# Patient Record
Sex: MALE | Race: WHITE | Employment: FULL TIME | URBAN - METROPOLITAN AREA
[De-identification: names, ages, dates, MRNs, and addresses within clinical notes are randomized per-mention and may not be internally consistent; named-entity substitution may affect disease eponyms.]

---

## 2021-10-28 ENCOUNTER — FLU SHOT (OUTPATIENT)
Dept: VACCINE CLINIC | Facility: HOSPITAL | Age: 41
End: 2021-10-28

## 2021-10-28 DIAGNOSIS — Z23 NEED FOR INFLUENZA VACCINATION: Primary | ICD-10-CM

## 2024-08-20 ENCOUNTER — OFFICE VISIT (OUTPATIENT)
Dept: SURGERY | Facility: CLINIC | Age: 44
End: 2024-08-20
Payer: COMMERCIAL

## 2024-08-20 VITALS — BODY MASS INDEX: 40.92 KG/M2 | RESPIRATION RATE: 16 BRPM | WEIGHT: 270 LBS | HEIGHT: 68 IN

## 2024-08-20 DIAGNOSIS — K42.9 UMBILICAL HERNIA WITHOUT OBSTRUCTION AND WITHOUT GANGRENE: Primary | ICD-10-CM

## 2024-08-20 PROCEDURE — 99203 OFFICE O/P NEW LOW 30 MIN: CPT | Performed by: SURGERY

## 2024-08-20 RX ORDER — CETIRIZINE HYDROCHLORIDE 10 MG/1
10 TABLET ORAL DAILY
COMMUNITY

## 2024-08-20 NOTE — LETTER
August 21, 2024       No Recipients    Patient: Aden Benitez   YOB: 1980   Date of Visit: 8/20/2024     Dear Felipa Snyder MD:       Thank you for referring Aden Benitez to me for evaluation. Below are the relevant portions of my assessment and plan of care.    If you have questions, please do not hesitate to call me. I look forward to following Aden along with you.         Sincerely,        Yosef Mcfarland MD        CC:   No Recipients    Yosef Mcfarland MD  08/21/24 1029  Sign when Signing Visit  General Surgery/Colorectal Surgery Note    Patient Name:  Aden Benitez  YOB: 1980  [id number]    Referring Provider: Felipa Snyder MD      Patient Care Team:  Felipa Snyder MD as PCP - General (Internal Medicine)  Yosef Mcfarland MD as Consulting Physician (General Surgery)    Chief complaint hernia    Subjective.     History of present illness:    6-month history of a bulge to the umbilicus.  No change in size.  No history of the same.  No previous abdominal surgery.  No incarceration or strangulation.  No tobacco use.  No blood thinner use.  No recent chest pain.  Occasional pain.  No alleviating factors.  Ultrasound 7/26/2024 with focal periumbilical abdominal wall hernia.  BMI 41.      History:  History reviewed. No pertinent past medical history.    History reviewed. No pertinent surgical history.    Family History   Problem Relation Age of Onset   • Heart disease Father         Heart valve issue       Social History     Tobacco Use   • Smoking status: Never   Substance Use Topics   • Alcohol use: Yes     Alcohol/week: 2.0 standard drinks of alcohol     Types: 1 Glasses of wine, 1 Cans of beer per week     Comment: Social drink less than one per week on average   • Drug use: Never       Review of Systems  All systems were reviewed and negative except for:   Review of Systems   Constitutional: Negative for chills, fever and unexpected weight loss.    HENT: Negative for congestion, nosebleeds and voice change.    Eyes: Negative for blurred vision, double vision and discharge.   Respiratory: Negative for apnea, chest tightness and shortness of breath.    Cardiovascular: Negative for chest pain and leg swelling.   Gastrointestinal:        See HPI   Endocrine: Negative for cold intolerance and heat intolerance.   Genitourinary: Negative for dysuria, hematuria and urgency.   Musculoskeletal: Negative for back pain, joint swelling and neck pain.   Skin: Negative for color change and dry skin.   Neurological: Negative for dizziness and confusion.   Hematological: Negative for adenopathy.   Psychiatric/Behavioral: Negative for agitation and behavioral problems.     MEDS:  Prior to Admission medications    Medication Sig Start Date End Date Taking? Authorizing Provider   cetirizine (zyrTEC) 10 MG tablet Take 1 tablet by mouth Daily.   Yes Provider, Historical, MD        Allergies:  Patient has no known allergies.    Objective    Vital Signs        Physical Exam:     General Appearance:    Alert, cooperative, in no acute distress   Head:    Normocephalic, without obvious abnormality, atraumatic   Eyes:          Conjunctivae and sclerae normal, no icterus,     Ears:    Ears appear intact with no abnormalities noted   Throat:   No oral lesions, no thrush, oral mucosa moist   Neck:   No adenopathy, supple, trachea midline, no thyromegaly   Back:     No kyphosis present, no scoliosis present, no skin lesions,      erythema or scars, no tenderness to percussion or                   palpation,   range of motion normal   Lungs:     Clear to auscultation,respirations regular, even and                  unlabored    Heart:    Regular rhythm and normal rate, normal S1 and S2, no            murmur, no gallop, no rub, no click   Chest Wall:    No abnormalities observed   Abdomen:     Normal bowel sounds, no masses, no organomegaly, soft        non-tender, non-distended, no guarding,  "no rebound                tenderness, nonreducible umbilical hernia without evidence of obstruction or gangrene   Rectal:        Extremities:   Moves all extremities well, no edema, no cyanosis, no             redness   Pulses:   Pulses palpable and equal bilaterally   Skin:   No bleeding, bruising or rash   Lymph nodes:   No palpable adenopathy   Neurologic:   A/o x 4 with no deficits       Results Review:   {Results Review:71071::\"I reviewed the patient's new clinical results.\"    LABS/IMAGING:  No results found for this or any previous visit.     Result Review:{Labs  Result Review  Imaging  Med Tab  Media     Assessment & Plan    Initial nonreducible umbilical hernia without evidence of obstruction or gangrene    Discussion with patient.  I explained him what a hernia was.  I discussed the warning signs of strangulation.  He was instructed on the emergency department for any concern.  I recommended robotic possible open umbilical hernia repair with mesh.  I explained the procedure and recovery.  Benefits and alternatives discussed.  Risk procedure including risk of anesthesia, bleeding, infection, mesh infection, conversion open, hernia recurrence, damage to surrounding structures including bowel, heart attack, stroke, blood clot, pneumonia discussed.  All questions answered.  He agrees with the plan.  Orders placed.  He was instructed to use chlorhexidine the night before surgery.  Thank you for the consult.              This document has been electronically signed by Yosef Mcfarland MD  August 21, 2024 10:26 EDT  "

## 2024-08-21 NOTE — PROGRESS NOTES
General Surgery/Colorectal Surgery Note    Patient Name:  Aden Benitez  YOB: 1980  [id number]    Referring Provider: Felipa Snyder MD      Patient Care Team:  Felipa Snyder MD as PCP - General (Internal Medicine)  Yosef Mcfarland MD as Consulting Physician (General Surgery)    Chief complaint hernia    Subjective .     History of present illness:    6-month history of a bulge to the umbilicus.  No change in size.  No history of the same.  No previous abdominal surgery.  No incarceration or strangulation.  No tobacco use.  No blood thinner use.  No recent chest pain.  Occasional pain.  No alleviating factors.  Ultrasound 7/26/2024 with focal periumbilical abdominal wall hernia.  BMI 41.      History:  History reviewed. No pertinent past medical history.    History reviewed. No pertinent surgical history.    Family History   Problem Relation Age of Onset    Heart disease Father         Heart valve issue       Social History     Tobacco Use    Smoking status: Never   Substance Use Topics    Alcohol use: Yes     Alcohol/week: 2.0 standard drinks of alcohol     Types: 1 Glasses of wine, 1 Cans of beer per week     Comment: Social drink less than one per week on average    Drug use: Never       Review of Systems  All systems were reviewed and negative except for:   Review of Systems   Constitutional: Negative for chills, fever and unexpected weight loss.   HENT: Negative for congestion, nosebleeds and voice change.    Eyes: Negative for blurred vision, double vision and discharge.   Respiratory: Negative for apnea, chest tightness and shortness of breath.    Cardiovascular: Negative for chest pain and leg swelling.   Gastrointestinal:        See HPI   Endocrine: Negative for cold intolerance and heat intolerance.   Genitourinary: Negative for dysuria, hematuria and urgency.   Musculoskeletal: Negative for back pain, joint swelling and neck pain.   Skin: Negative for color change and dry  "skin.   Neurological: Negative for dizziness and confusion.   Hematological: Negative for adenopathy.   Psychiatric/Behavioral: Negative for agitation and behavioral problems.     MEDS:  Prior to Admission medications    Medication Sig Start Date End Date Taking? Authorizing Provider   cetirizine (zyrTEC) 10 MG tablet Take 1 tablet by mouth Daily.   Yes Provider, Historical, MD        Allergies:  Patient has no known allergies.    Objective     Vital Signs        Physical Exam:     General Appearance:    Alert, cooperative, in no acute distress   Head:    Normocephalic, without obvious abnormality, atraumatic   Eyes:          Conjunctivae and sclerae normal, no icterus,     Ears:    Ears appear intact with no abnormalities noted   Throat:   No oral lesions, no thrush, oral mucosa moist   Neck:   No adenopathy, supple, trachea midline, no thyromegaly   Back:     No kyphosis present, no scoliosis present, no skin lesions,      erythema or scars, no tenderness to percussion or                   palpation,   range of motion normal   Lungs:     Clear to auscultation,respirations regular, even and                  unlabored    Heart:    Regular rhythm and normal rate, normal S1 and S2, no            murmur, no gallop, no rub, no click   Chest Wall:    No abnormalities observed   Abdomen:     Normal bowel sounds, no masses, no organomegaly, soft        non-tender, non-distended, no guarding, no rebound                tenderness, nonreducible umbilical hernia without evidence of obstruction or gangrene   Rectal:        Extremities:   Moves all extremities well, no edema, no cyanosis, no             redness   Pulses:   Pulses palpable and equal bilaterally   Skin:   No bleeding, bruising or rash   Lymph nodes:   No palpable adenopathy   Neurologic:   A/o x 4 with no deficits       Results Review:   {Results Review:22858::\"I reviewed the patient's new clinical results.\"    LABS/IMAGING:  No results found for this or any " previous visit.     Result Review :{Labs  Result Review  Imaging  Med Tab  Media     Assessment & Plan     Initial nonreducible umbilical hernia without evidence of obstruction or gangrene    Discussion with patient.  I explained him what a hernia was.  I discussed the warning signs of strangulation.  He was instructed on the emergency department for any concern.  I recommended robotic possible open umbilical hernia repair with mesh.  I explained the procedure and recovery.  Benefits and alternatives discussed.  Risk procedure including risk of anesthesia, bleeding, infection, mesh infection, conversion open, hernia recurrence, damage to surrounding structures including bowel, heart attack, stroke, blood clot, pneumonia discussed.  All questions answered.  He agrees with the plan.  Orders placed.  He was instructed to use chlorhexidine the night before surgery.  Thank you for the consult.              This document has been electronically signed by Yosef Mcfarland MD  August 21, 2024 10:26 EDT

## 2025-01-30 ENCOUNTER — OFFICE VISIT (OUTPATIENT)
Dept: SURGERY | Facility: CLINIC | Age: 45
End: 2025-01-30
Payer: COMMERCIAL

## 2025-01-30 ENCOUNTER — PREP FOR SURGERY (OUTPATIENT)
Dept: OTHER | Facility: HOSPITAL | Age: 45
End: 2025-01-30
Payer: COMMERCIAL

## 2025-01-30 VITALS — WEIGHT: 286 LBS | HEIGHT: 68 IN | BODY MASS INDEX: 43.35 KG/M2 | RESPIRATION RATE: 18 BRPM

## 2025-01-30 DIAGNOSIS — K42.0 INCARCERATED UMBILICAL HERNIA: Primary | ICD-10-CM

## 2025-01-30 RX ORDER — SODIUM CHLORIDE 0.9 % (FLUSH) 0.9 %
10 SYRINGE (ML) INJECTION EVERY 12 HOURS SCHEDULED
OUTPATIENT
Start: 2025-01-30

## 2025-01-30 RX ORDER — SODIUM CHLORIDE 9 MG/ML
40 INJECTION, SOLUTION INTRAVENOUS AS NEEDED
OUTPATIENT
Start: 2025-01-30

## 2025-01-30 RX ORDER — SODIUM CHLORIDE 0.9 % (FLUSH) 0.9 %
10 SYRINGE (ML) INJECTION AS NEEDED
OUTPATIENT
Start: 2025-01-30

## 2025-01-30 RX ORDER — SODIUM CHLORIDE, SODIUM LACTATE, POTASSIUM CHLORIDE, CALCIUM CHLORIDE 600; 310; 30; 20 MG/100ML; MG/100ML; MG/100ML; MG/100ML
50 INJECTION, SOLUTION INTRAVENOUS CONTINUOUS
OUTPATIENT
Start: 2025-01-30 | End: 2025-01-31

## 2025-01-30 NOTE — LETTER
January 31, 2025     Felipa Snyder MD  [address]  [address]  [place] [address]    Patient: Aden Benitez   YOB: 1980   Date of Visit: 1/30/2025     Dear Felipa Snyder MD:       Thank you for referring Aden Benitez to me for evaluation. Below are the relevant portions of my assessment and plan of care.    If you have questions, please do not hesitate to call me. I look forward to following Aden along with you.         Sincerely,        Yosef Mcfarland MD        CC: No Recipients    Yosef Mcfarland MD  01/31/25 0832  Sign when Signing Visit  General Surgery/Colorectal Surgery Note    Patient Name:  Aden Benitez  YOB: 1980  [id number]    Referring Provider: No ref. provider found      Patient Care Team:  Felipa Snyder MD as PCP - General (Internal Medicine)  Yosef Mcfarland MD as Consulting Physician (General Surgery)    Chief complaint hernia    Subjective.     History of present illness:    Previously seen with 6-month history of a bulge to the umbilicus.  No change in size.  No history of the same.  No previous abdominal surgery.  No incarceration or strangulation.  No tobacco use.  No blood thinner use.  No recent chest pain.  Occasional pain.  No alleviating factors.  Ultrasound 7/26/2024 with focal periumbilical abdominal wall hernia.      He comes in for follow-up.  His hernia is still bothering him.  No changes in health or medications since last seen.      History:  History reviewed. No pertinent past medical history.    History reviewed. No pertinent surgical history.    Family History   Problem Relation Age of Onset   • Heart disease Father         Heart valve issue       Social History     Tobacco Use   • Smoking status: Never   • Smokeless tobacco: Never   Vaping Use   • Vaping status: Never Used   Substance Use Topics   • Alcohol use: Yes     Alcohol/week: 2.0 standard drinks of alcohol     Types: 1 Glasses of wine, 1 Cans of beer per  week     Comment: Social drink less than one per week on average   • Drug use: Never       Review of Systems  All systems were reviewed and negative except for:   Review of Systems   Constitutional: Negative for chills, fever and unexpected weight loss.   HENT: Negative for congestion, nosebleeds and voice change.    Eyes: Negative for blurred vision, double vision and discharge.   Respiratory: Negative for apnea, chest tightness and shortness of breath.    Cardiovascular: Negative for chest pain and leg swelling.   Gastrointestinal:        See HPI   Endocrine: Negative for cold intolerance and heat intolerance.   Genitourinary: Negative for dysuria, hematuria and urgency.   Musculoskeletal: Negative for back pain, joint swelling and neck pain.   Skin: Negative for color change and dry skin.   Neurological: Negative for dizziness and confusion.   Hematological: Negative for adenopathy.   Psychiatric/Behavioral: Negative for agitation and behavioral problems.     MEDS:  Prior to Admission medications    Medication Sig Start Date End Date Taking? Authorizing Provider   cetirizine (zyrTEC) 10 MG tablet Take 1 tablet by mouth Daily.   Yes Provider, Historical, MD        Allergies:  Patient has no known allergies.    Objective    Vital Signs   Resp:  [18] 18    Physical Exam:     General Appearance:    Alert, cooperative, in no acute distress   Head:    Normocephalic, without obvious abnormality, atraumatic   Eyes:          Conjunctivae and sclerae normal, no icterus,     Ears:    Ears appear intact with no abnormalities noted   Throat:   No oral lesions, no thrush, oral mucosa moist   Neck:   No adenopathy, supple, trachea midline, no thyromegaly   Back:     No kyphosis present, no scoliosis present, no skin lesions,      erythema or scars, no tenderness to percussion or                   palpation,   range of motion normal   Lungs:     Clear to auscultation,respirations regular, even and                  unlabored     "Heart:    Regular rhythm and normal rate, normal S1 and S2, no            murmur, no gallop, no rub, no click   Chest Wall:    No abnormalities observed   Abdomen:     Normal bowel sounds, no masses, no organomegaly, soft        non-tender, non-distended, no guarding, no rebound                tenderness, nonreducible umbilical hernia without evidence of strangulation   Rectal:        Extremities:   Moves all extremities well, no edema, no cyanosis, no             redness   Pulses:   Pulses palpable and equal bilaterally   Skin:   No bleeding, bruising or rash   Lymph nodes:   No palpable adenopathy   Neurologic:   A/o x 4 with no deficits       Results Review:   {Results Review:53903::\"I reviewed the patient's new clinical results.\"    LABS/IMAGING:  No results found for this or any previous visit.     Result Review: Labs  Result Review  Imaging  Med Tab  Media     Assessment & Plan    Initial nonreducible umbilical hernia without evidence of obstruction or gangrene    Discussion with patient.  I discussed the warning signs of strangulation.  He was instructed go to emergency department for any concern.  I recommended robotic possible open umbilical hernia repair with mesh.  Procedure and recovery discussed.  Benefits and alternatives discussed.  Risk procedure including risk of anesthesia, bleeding, infection, conversion open, mesh infection, recurrence of hernia, heart attack, stroke, blood clot, pneumonia, damage to surrounding structures including bowel discussed.  All questions answered.  He agrees with the plan.  Orders placed.  He was instructed to use chlorhexidine the night before surgery.                This document has been electronically signed by Yosef Mcfarland MD  January 31, 2025 08:29 EST  "

## 2025-01-31 NOTE — PROGRESS NOTES
General Surgery/Colorectal Surgery Note    Patient Name:  Aden Benitez  YOB: 1980  [id number]    Referring Provider: No ref. provider found      Patient Care Team:  Felipa Snyder MD as PCP - General (Internal Medicine)  Yosef Mcfarland MD as Consulting Physician (General Surgery)    Chief complaint hernia    Subjective .     History of present illness:    Previously seen with 6-month history of a bulge to the umbilicus.  No change in size.  No history of the same.  No previous abdominal surgery.  No incarceration or strangulation.  No tobacco use.  No blood thinner use.  No recent chest pain.  Occasional pain.  No alleviating factors.  Ultrasound 7/26/2024 with focal periumbilical abdominal wall hernia.      He comes in for follow-up.  His hernia is still bothering him.  No changes in health or medications since last seen.      History:  History reviewed. No pertinent past medical history.    History reviewed. No pertinent surgical history.    Family History   Problem Relation Age of Onset    Heart disease Father         Heart valve issue       Social History     Tobacco Use    Smoking status: Never    Smokeless tobacco: Never   Vaping Use    Vaping status: Never Used   Substance Use Topics    Alcohol use: Yes     Alcohol/week: 2.0 standard drinks of alcohol     Types: 1 Glasses of wine, 1 Cans of beer per week     Comment: Social drink less than one per week on average    Drug use: Never       Review of Systems  All systems were reviewed and negative except for:   Review of Systems   Constitutional: Negative for chills, fever and unexpected weight loss.   HENT: Negative for congestion, nosebleeds and voice change.    Eyes: Negative for blurred vision, double vision and discharge.   Respiratory: Negative for apnea, chest tightness and shortness of breath.    Cardiovascular: Negative for chest pain and leg swelling.   Gastrointestinal:        See HPI   Endocrine: Negative for cold  intolerance and heat intolerance.   Genitourinary: Negative for dysuria, hematuria and urgency.   Musculoskeletal: Negative for back pain, joint swelling and neck pain.   Skin: Negative for color change and dry skin.   Neurological: Negative for dizziness and confusion.   Hematological: Negative for adenopathy.   Psychiatric/Behavioral: Negative for agitation and behavioral problems.     MEDS:  Prior to Admission medications    Medication Sig Start Date End Date Taking? Authorizing Provider   cetirizine (zyrTEC) 10 MG tablet Take 1 tablet by mouth Daily.   Yes Provider, Historical, MD        Allergies:  Patient has no known allergies.    Objective     Vital Signs   Resp:  [18] 18    Physical Exam:     General Appearance:    Alert, cooperative, in no acute distress   Head:    Normocephalic, without obvious abnormality, atraumatic   Eyes:          Conjunctivae and sclerae normal, no icterus,     Ears:    Ears appear intact with no abnormalities noted   Throat:   No oral lesions, no thrush, oral mucosa moist   Neck:   No adenopathy, supple, trachea midline, no thyromegaly   Back:     No kyphosis present, no scoliosis present, no skin lesions,      erythema or scars, no tenderness to percussion or                   palpation,   range of motion normal   Lungs:     Clear to auscultation,respirations regular, even and                  unlabored    Heart:    Regular rhythm and normal rate, normal S1 and S2, no            murmur, no gallop, no rub, no click   Chest Wall:    No abnormalities observed   Abdomen:     Normal bowel sounds, no masses, no organomegaly, soft        non-tender, non-distended, no guarding, no rebound                tenderness, nonreducible umbilical hernia without evidence of strangulation   Rectal:        Extremities:   Moves all extremities well, no edema, no cyanosis, no             redness   Pulses:   Pulses palpable and equal bilaterally   Skin:   No bleeding, bruising or rash   Lymph nodes:   No  "palpable adenopathy   Neurologic:   A/o x 4 with no deficits       Results Review:   {Results Review:31319::\"I reviewed the patient's new clinical results.\"    LABS/IMAGING:  No results found for this or any previous visit.     Result Review : Labs  Result Review  Imaging  Med Tab  Media     Assessment & Plan     Initial nonreducible umbilical hernia without evidence of obstruction or gangrene    Discussion with patient.  I discussed the warning signs of strangulation.  He was instructed go to emergency department for any concern.  I recommended robotic possible open umbilical hernia repair with mesh.  Procedure and recovery discussed.  Benefits and alternatives discussed.  Risk procedure including risk of anesthesia, bleeding, infection, conversion open, mesh infection, recurrence of hernia, heart attack, stroke, blood clot, pneumonia, damage to surrounding structures including bowel discussed.  All questions answered.  He agrees with the plan.  Orders placed.  He was instructed to use chlorhexidine the night before surgery.                This document has been electronically signed by Yosef Mcfarland MD  January 31, 2025 08:29 EST  "

## 2025-03-21 ENCOUNTER — TELEPHONE (OUTPATIENT)
Dept: SURGERY | Facility: CLINIC | Age: 45
End: 2025-03-21
Payer: COMMERCIAL

## 2025-03-21 NOTE — TELEPHONE ENCOUNTER
Informed pt that we needed to r/s his surgery. He stated that he would call back to r/s his surgery. He is aware that his surgery is scheduled for 4/23/2025. He will call back on Monday.

## 2025-03-24 ENCOUNTER — TELEPHONE (OUTPATIENT)
Dept: SURGERY | Facility: CLINIC | Age: 45
End: 2025-03-24
Payer: COMMERCIAL

## 2025-03-24 NOTE — TELEPHONE ENCOUNTER
Hub staff attempted to follow warm transfer process and was unsuccessful     Caller: REGINALDO FLOYD    Relationship to patient: SELF    Best call back number: [phone]    Patient is needing: PATIENT NEEDS TO BE SCHEDULED ASAP FOR PROCEDURE DUE TO WORK SCHEDULE AND RECOVERY TIME, PT NEEDS NEXT WEEK IF POSS. SURGERY IS CURRENTLY SCHEDULE 4/23/25.  PLEASE ADVISE            Zyclara Pregnancy And Lactation Text: This medication is Pregnancy Category C. It is unknown if this medication is excreted in breast milk.

## 2025-07-01 RX ORDER — MULTIPLE VITAMINS W/ MINERALS TAB 9MG-400MCG
1 TAB ORAL DAILY
COMMUNITY

## 2025-07-01 NOTE — PRE-PROCEDURE INSTRUCTIONS
IMPORTANT INSTRUCTIONS - PRE-ADMISSION TESTING  DO NOT EAT OR CHEW anything after midnight the night before your procedure.    NPO AFTER MN EXCEPT SIPS OF WATER TO TAKE MEDICATIONS LISTED BELOW  Take the following medications the morning of your procedure with JUST A SIP OF WATER:  NONE    DO NOT BRING your medications to the hospital with you, UNLESS something has changed since your PRE-Admission Testing appointment.  Hold all vitamins, supplements, and NSAIDS (Non- steroidal anti-inflammatory meds) for one week prior to surgery (you MAY take Tylenol or Acetaminophen).  If you are diabetic, check your blood sugar the morning of your procedure. If it is less than 70 or if you are feeling symptomatic, call the following number for further instructions: [phone]  Use your inhalers/nebulizers as usual, the morning of your procedure. BRING YOUR INHALERS with you.   Bring your CPAP or BIPAP to hospital, ONLY IF YOU WILL BE SPENDING THE NIGHT.   Make sure you have a ride home and have someone who will stay with you the day of your procedure after you go home.  If you have any questions, please call your Pre-Admission Testing Nurse, ___LUCY_____________ at [phone]____________.   Per anesthesia request, do not smoke for 24 hours before your procedure or as instructed by your surgeon.    WILL CALL ON  7/3/25       NORMALLY BETWEEN 1 AND 4 PM TO GIVE OFFICIAL ARRIVAL TIME FOR DAY OF PROCEDURE  BATHING INSTRUCTIONS GIVEN. NO JEWELRY OF ANY TYPE DAY OF PROCEDURE  COME TO [facility] [address] DAY OF PROCEDURE. GET ON ELEVATOR AND COME TO FIRST FLOOR TAKE LEFT OFF OF ELEVATOR.   CASH,  OR CARD FOR MEDS TO BED IF INDICATED AT DISCHARGE

## 2025-07-07 ENCOUNTER — HOSPITAL ENCOUNTER (OUTPATIENT)
Facility: HOSPITAL | Age: 45
Setting detail: HOSPITAL OUTPATIENT SURGERY
Discharge: HOME | End: 2025-07-07
Attending: SURGERY | Admitting: SURGERY
Payer: COMMERCIAL

## 2025-07-07 ENCOUNTER — ANESTHESIA (OUTPATIENT)
Dept: PERIOP | Facility: HOSPITAL | Age: 45
End: 2025-07-07
Payer: COMMERCIAL

## 2025-07-07 ENCOUNTER — ANESTHESIA EVENT (OUTPATIENT)
Dept: PERIOP | Facility: HOSPITAL | Age: 45
End: 2025-07-07
Payer: COMMERCIAL

## 2025-07-07 VITALS
SYSTOLIC BLOOD PRESSURE: 113 MMHG | DIASTOLIC BLOOD PRESSURE: 64 MMHG | OXYGEN SATURATION: 94 % | TEMPERATURE: 98 F | HEART RATE: 70 BPM | RESPIRATION RATE: 18 BRPM | HEIGHT: 68 IN | WEIGHT: 270.5 LBS | BODY MASS INDEX: 41 KG/M2

## 2025-07-07 DIAGNOSIS — K42.0 INCARCERATED UMBILICAL HERNIA: ICD-10-CM

## 2025-07-07 PROCEDURE — 25010000002 MIDAZOLAM PER 1MG: Performed by: ANESTHESIOLOGY

## 2025-07-07 PROCEDURE — 25810000003 LACTATED RINGERS PER 1000 ML: Performed by: ANESTHESIOLOGY

## 2025-07-07 PROCEDURE — 25010000002 ONDANSETRON PER 1 MG

## 2025-07-07 PROCEDURE — 25010000002 FENTANYL CITRATE (PF) 50 MCG/ML SOLUTION

## 2025-07-07 PROCEDURE — 25010000002 PROPOFOL 10 MG/ML EMULSION

## 2025-07-07 PROCEDURE — C1781 MESH (IMPLANTABLE): HCPCS | Performed by: SURGERY

## 2025-07-07 PROCEDURE — 25010000002 KETOROLAC TROMETHAMINE PER 15 MG

## 2025-07-07 PROCEDURE — 25010000002 DEXAMETHASONE PER 1 MG

## 2025-07-07 PROCEDURE — 25010000002 LIDOCAINE PF 2% 2 % SOLUTION

## 2025-07-07 PROCEDURE — 49594 RPR AA HRN 1ST 3-10 NCR/STRN: CPT

## 2025-07-07 PROCEDURE — 25010000002 CEFAZOLIN 3 G RECONSTITUTED SOLUTION 1 EACH VIAL: Performed by: SURGERY

## 2025-07-07 PROCEDURE — 25010000002 SUGAMMADEX 200 MG/2ML SOLUTION

## 2025-07-07 PROCEDURE — 49594 RPR AA HRN 1ST 3-10 NCR/STRN: CPT | Performed by: SURGERY

## 2025-07-07 DEVICE — ABSORBABLE WOUND CLOSURE DEVICE
Type: IMPLANTABLE DEVICE | Site: ABDOMEN | Status: FUNCTIONAL
Brand: V-LOC 90

## 2025-07-07 DEVICE — ABSORBABLE WOUND CLOSURE DEVICE
Type: IMPLANTABLE DEVICE | Site: ABDOMEN | Status: FUNCTIONAL
Brand: SYNETURE

## 2025-07-07 DEVICE — VENTRALIGHT ST MESH, 4.5" (11.4 CM), CIRCLE
Type: IMPLANTABLE DEVICE | Site: ABDOMEN | Status: FUNCTIONAL
Brand: VENTRALIGHT

## 2025-07-07 RX ORDER — PETROLATUM,WHITE
OINTMENT IN PACKET (GRAM) TOPICAL AS NEEDED
Status: DISCONTINUED | OUTPATIENT
Start: 2025-07-07 | End: 2025-07-07 | Stop reason: SURG

## 2025-07-07 RX ORDER — ONDANSETRON 2 MG/ML
4 INJECTION INTRAMUSCULAR; INTRAVENOUS ONCE AS NEEDED
Status: DISCONTINUED | OUTPATIENT
Start: 2025-07-07 | End: 2025-07-07 | Stop reason: HOSPADM

## 2025-07-07 RX ORDER — PROPOFOL 10 MG/ML
VIAL (ML) INTRAVENOUS AS NEEDED
Status: DISCONTINUED | OUTPATIENT
Start: 2025-07-07 | End: 2025-07-07 | Stop reason: SURG

## 2025-07-07 RX ORDER — SODIUM CHLORIDE, SODIUM LACTATE, POTASSIUM CHLORIDE, CALCIUM CHLORIDE 600; 310; 30; 20 MG/100ML; MG/100ML; MG/100ML; MG/100ML
9 INJECTION, SOLUTION INTRAVENOUS CONTINUOUS PRN
Status: DISCONTINUED | OUTPATIENT
Start: 2025-07-07 | End: 2025-07-07 | Stop reason: HOSPADM

## 2025-07-07 RX ORDER — PROMETHAZINE HYDROCHLORIDE 25 MG/1
25 TABLET ORAL ONCE AS NEEDED
Status: DISCONTINUED | OUTPATIENT
Start: 2025-07-07 | End: 2025-07-07 | Stop reason: HOSPADM

## 2025-07-07 RX ORDER — DEXAMETHASONE SODIUM PHOSPHATE 4 MG/ML
INJECTION, SOLUTION INTRA-ARTICULAR; INTRALESIONAL; INTRAMUSCULAR; INTRAVENOUS; SOFT TISSUE AS NEEDED
Status: DISCONTINUED | OUTPATIENT
Start: 2025-07-07 | End: 2025-07-07 | Stop reason: SURG

## 2025-07-07 RX ORDER — SODIUM CHLORIDE 0.9 % (FLUSH) 0.9 %
10 SYRINGE (ML) INJECTION EVERY 12 HOURS SCHEDULED
Status: DISCONTINUED | OUTPATIENT
Start: 2025-07-07 | End: 2025-07-07 | Stop reason: HOSPADM

## 2025-07-07 RX ORDER — ONDANSETRON 2 MG/ML
INJECTION INTRAMUSCULAR; INTRAVENOUS AS NEEDED
Status: DISCONTINUED | OUTPATIENT
Start: 2025-07-07 | End: 2025-07-07 | Stop reason: SURG

## 2025-07-07 RX ORDER — KETOROLAC TROMETHAMINE 30 MG/ML
INJECTION, SOLUTION INTRAMUSCULAR; INTRAVENOUS AS NEEDED
Status: DISCONTINUED | OUTPATIENT
Start: 2025-07-07 | End: 2025-07-07 | Stop reason: SURG

## 2025-07-07 RX ORDER — SODIUM CHLORIDE 9 MG/ML
40 INJECTION, SOLUTION INTRAVENOUS AS NEEDED
Status: DISCONTINUED | OUTPATIENT
Start: 2025-07-07 | End: 2025-07-07 | Stop reason: HOSPADM

## 2025-07-07 RX ORDER — ROCURONIUM BROMIDE 10 MG/ML
INJECTION, SOLUTION INTRAVENOUS AS NEEDED
Status: DISCONTINUED | OUTPATIENT
Start: 2025-07-07 | End: 2025-07-07 | Stop reason: SURG

## 2025-07-07 RX ORDER — KETAMINE HYDROCHLORIDE 10 MG/ML
INJECTION, SOLUTION INTRAMUSCULAR; INTRAVENOUS AS NEEDED
Status: DISCONTINUED | OUTPATIENT
Start: 2025-07-07 | End: 2025-07-07 | Stop reason: SURG

## 2025-07-07 RX ORDER — SODIUM CHLORIDE, SODIUM LACTATE, POTASSIUM CHLORIDE, CALCIUM CHLORIDE 600; 310; 30; 20 MG/100ML; MG/100ML; MG/100ML; MG/100ML
50 INJECTION, SOLUTION INTRAVENOUS CONTINUOUS
Status: DISCONTINUED | OUTPATIENT
Start: 2025-07-07 | End: 2025-07-07 | Stop reason: HOSPADM

## 2025-07-07 RX ORDER — DEXMEDETOMIDINE HYDROCHLORIDE 100 UG/ML
INJECTION, SOLUTION INTRAVENOUS AS NEEDED
Status: DISCONTINUED | OUTPATIENT
Start: 2025-07-07 | End: 2025-07-07 | Stop reason: SURG

## 2025-07-07 RX ORDER — PROMETHAZINE HYDROCHLORIDE 25 MG/1
25 SUPPOSITORY RECTAL ONCE AS NEEDED
Status: DISCONTINUED | OUTPATIENT
Start: 2025-07-07 | End: 2025-07-07 | Stop reason: HOSPADM

## 2025-07-07 RX ORDER — ACETAMINOPHEN 500 MG
1000 TABLET ORAL ONCE
Status: COMPLETED | OUTPATIENT
Start: 2025-07-07 | End: 2025-07-07

## 2025-07-07 RX ORDER — MIDAZOLAM HYDROCHLORIDE 2 MG/2ML
2 INJECTION, SOLUTION INTRAMUSCULAR; INTRAVENOUS ONCE
Status: COMPLETED | OUTPATIENT
Start: 2025-07-07 | End: 2025-07-07

## 2025-07-07 RX ORDER — OXYCODONE AND ACETAMINOPHEN 5; 325 MG/1; MG/1
1 TABLET ORAL EVERY 6 HOURS PRN
Qty: 12 TABLET | Refills: 0 | Status: SHIPPED | OUTPATIENT
Start: 2025-07-07

## 2025-07-07 RX ORDER — OXYCODONE HYDROCHLORIDE 5 MG/1
5 TABLET ORAL
Status: DISCONTINUED | OUTPATIENT
Start: 2025-07-07 | End: 2025-07-07 | Stop reason: HOSPADM

## 2025-07-07 RX ORDER — SODIUM CHLORIDE 0.9 % (FLUSH) 0.9 %
10 SYRINGE (ML) INJECTION AS NEEDED
Status: DISCONTINUED | OUTPATIENT
Start: 2025-07-07 | End: 2025-07-07 | Stop reason: HOSPADM

## 2025-07-07 RX ORDER — POLYETHYLENE GLYCOL 3350 17 G/17G
17 POWDER, FOR SOLUTION ORAL DAILY
Qty: 5 PACKET | Refills: 0 | Status: SHIPPED | OUTPATIENT
Start: 2025-07-07

## 2025-07-07 RX ORDER — EPHEDRINE SULFATE 50 MG/ML
50 INJECTION, SOLUTION INTRAVENOUS ONCE
Status: DISCONTINUED | OUTPATIENT
Start: 2025-07-07 | End: 2025-07-07 | Stop reason: HOSPADM

## 2025-07-07 RX ORDER — LIDOCAINE HYDROCHLORIDE 20 MG/ML
INJECTION, SOLUTION EPIDURAL; INFILTRATION; INTRACAUDAL; PERINEURAL AS NEEDED
Status: DISCONTINUED | OUTPATIENT
Start: 2025-07-07 | End: 2025-07-07 | Stop reason: SURG

## 2025-07-07 RX ORDER — FENTANYL CITRATE 50 UG/ML
INJECTION, SOLUTION INTRAMUSCULAR; INTRAVENOUS AS NEEDED
Status: DISCONTINUED | OUTPATIENT
Start: 2025-07-07 | End: 2025-07-07 | Stop reason: SURG

## 2025-07-07 RX ADMIN — MIDAZOLAM HYDROCHLORIDE 2 MG: 1 INJECTION, SOLUTION INTRAMUSCULAR; INTRAVENOUS at 07:26

## 2025-07-07 RX ADMIN — KETOROLAC TROMETHAMINE 30 MG: 30 INJECTION, SOLUTION INTRAMUSCULAR; INTRAVENOUS at 08:21

## 2025-07-07 RX ADMIN — LIDOCAINE HYDROCHLORIDE 100 MG: 20 INJECTION, SOLUTION EPIDURAL; INFILTRATION; INTRACAUDAL; PERINEURAL at 07:38

## 2025-07-07 RX ADMIN — ONDANSETRON 4 MG: 2 INJECTION, SOLUTION INTRAMUSCULAR; INTRAVENOUS at 08:03

## 2025-07-07 RX ADMIN — DEXAMETHASONE SODIUM PHOSPHATE 4 MG: 4 INJECTION, SOLUTION INTRAMUSCULAR; INTRAVENOUS at 08:03

## 2025-07-07 RX ADMIN — Medication 0.5 G: at 07:42

## 2025-07-07 RX ADMIN — FENTANYL CITRATE 100 MCG: 50 INJECTION, SOLUTION INTRAMUSCULAR; INTRAVENOUS at 07:35

## 2025-07-07 RX ADMIN — PROPOFOL 200 MG: 10 INJECTION, EMULSION INTRAVENOUS at 07:38

## 2025-07-07 RX ADMIN — SODIUM CHLORIDE 3000 MG: 9 INJECTION, SOLUTION INTRAVENOUS at 07:45

## 2025-07-07 RX ADMIN — ROCURONIUM BROMIDE 60 MG: 10 INJECTION, SOLUTION INTRAVENOUS at 07:38

## 2025-07-07 RX ADMIN — SODIUM CHLORIDE, POTASSIUM CHLORIDE, SODIUM LACTATE AND CALCIUM CHLORIDE 9 ML/HR: 600; 310; 30; 20 INJECTION, SOLUTION INTRAVENOUS at 07:07

## 2025-07-07 RX ADMIN — SUGAMMADEX 100 MG: 100 INJECTION, SOLUTION INTRAVENOUS at 08:24

## 2025-07-07 RX ADMIN — SUGAMMADEX 300 MG: 100 INJECTION, SOLUTION INTRAVENOUS at 08:22

## 2025-07-07 RX ADMIN — DEXMEDETOMIDINE 20 MCG: 100 INJECTION, SOLUTION INTRAVENOUS at 07:57

## 2025-07-07 RX ADMIN — ACETAMINOPHEN 1000 MG: 500 TABLET ORAL at 07:07

## 2025-07-07 RX ADMIN — KETAMINE HYDROCHLORIDE 40 MG: 10 INJECTION INTRAMUSCULAR; INTRAVENOUS at 07:49

## 2025-07-07 RX ADMIN — SODIUM CHLORIDE, POTASSIUM CHLORIDE, SODIUM LACTATE AND CALCIUM CHLORIDE: 600; 310; 30; 20 INJECTION, SOLUTION INTRAVENOUS at 08:21

## 2025-07-07 RX ADMIN — OXYCODONE HYDROCHLORIDE 5 MG: 5 TABLET ORAL at 08:43

## 2025-07-07 NOTE — H&P
No changes    History of present illness:    Previously seen with 6-month history of a bulge to the umbilicus.  No change in size.  No history of the same.  No previous abdominal surgery.  No incarceration or strangulation.  No tobacco use.  No blood thinner use.  No recent chest pain.  Occasional pain.  No alleviating factors.  Ultrasound 7/26/2024 with focal periumbilical abdominal wall hernia.       He comes in for follow-up.  His hernia is still bothering him.  No changes in health or medications since last seen.        History:  Medical History   History reviewed. No pertinent past medical history.        Surgical History   History reviewed. No pertinent surgical history.              Family History   Problem Relation Age of Onset    Heart disease Father           Heart valve issue         Social History   Social History            Tobacco Use    Smoking status: Never    Smokeless tobacco: Never   Vaping Use    Vaping status: Never Used   Substance Use Topics    Alcohol use: Yes       Alcohol/week: 2.0 standard drinks of alcohol       Types: 1 Glasses of wine, 1 Cans of beer per week       Comment: Social drink less than one per week on average    Drug use: Never            Review of Systems  All systems were reviewed and negative except for:   Review of Systems   Constitutional: Negative for chills, fever and unexpected weight loss.   HENT: Negative for congestion, nosebleeds and voice change.    Eyes: Negative for blurred vision, double vision and discharge.   Respiratory: Negative for apnea, chest tightness and shortness of breath.    Cardiovascular: Negative for chest pain and leg swelling.   Gastrointestinal:        See HPI   Endocrine: Negative for cold intolerance and heat intolerance.   Genitourinary: Negative for dysuria, hematuria and urgency.   Musculoskeletal: Negative for back pain, joint swelling and neck pain.   Skin: Negative for color change and dry skin.   Neurological: Negative for dizziness  "and confusion.   Hematological: Negative for adenopathy.   Psychiatric/Behavioral: Negative for agitation and behavioral problems.      MEDS:          Prior to Admission medications    Medication Sig Start Date End Date Taking? Authorizing Provider   cetirizine (zyrTEC) 10 MG tablet Take 1 tablet by mouth Daily.     Yes Provider, Historical, MD         Allergies:  Patient has no known allergies.     Objective  Vital Signs   Resp:  [18] 18     Physical Exam:                General Appearance:    Alert, cooperative, in no acute distress   Head:    Normocephalic, without obvious abnormality, atraumatic   Eyes:          Conjunctivae and sclerae normal, no icterus,      Ears:    Ears appear intact with no abnormalities noted   Throat:   No oral lesions, no thrush, oral mucosa moist   Neck:   No adenopathy, supple, trachea midline, no thyromegaly   Back:     No kyphosis present, no scoliosis present, no skin lesions,      erythema or scars, no tenderness to percussion or                   palpation,   range of motion normal   Lungs:     Clear to auscultation,respirations regular, even and                  unlabored    Heart:    Regular rhythm and normal rate, normal S1 and S2, no            murmur, no gallop, no rub, no click   Chest Wall:    No abnormalities observed   Abdomen:     Normal bowel sounds, no masses, no organomegaly, soft        non-tender, non-distended, no guarding, no rebound                tenderness, nonreducible umbilical hernia without evidence of strangulation   Rectal:        Extremities:   Moves all extremities well, no edema, no cyanosis, no             redness   Pulses:   Pulses palpable and equal bilaterally   Skin:   No bleeding, bruising or rash   Lymph nodes:   No palpable adenopathy   Neurologic:   A/o x 4 with no deficits         Results Review:              {Results Review:45218::\"I reviewed the patient's new clinical results.\"     LABS/IMAGING:  No results found for this or any previous " visit.      Result Review  : Labs  Result Review  Imaging  Med Tab  Media      Assessment & Plan  Initial nonreducible umbilical hernia without evidence of obstruction or gangrene     Discussion with patient.  I discussed the warning signs of strangulation.  He was instructed go to emergency department for any concern.  I recommended robotic possible open umbilical hernia repair with mesh.  Procedure and recovery discussed.  Benefits and alternatives discussed.  Risk procedure including risk of anesthesia, bleeding, infection, conversion open, mesh infection, recurrence of hernia, heart attack, stroke, blood clot, pneumonia, damage to surrounding structures including bowel discussed.  All questions answered.  He agrees with the plan.

## 2025-07-07 NOTE — OP NOTE
OP NOTE  VENTRAL / INCISIONAL HERNIA REPAIR LAPAROSCOPIC WITH DAVINCI ROBOT  Procedure Report    Patient Name:  Aden Benitez  YOB: 1980  [id number]    Date of Surgery:  7/7/2025     Indications: See last clinic note for indications, discussion of risk benefits and alternative    Pre-op Diagnosis:   Incarcerated umbilical hernia [K42.0], initial, no evidence of strangulation       Post-Op Diagnosis Codes:     * Incarcerated umbilical hernia [K42.0], same    Procedure:  Robotic repair of initial incarcerated umbilical hernia with mesh    Staff:  Surgeon(s):  Yosef Mcfarland MD    Assistant: Jeannie Parsons CSA    Anesthesia: General, Local    Estimated Blood Loss: 5 mL    Implants:    Implant Name Type Inv. Item Serial No.  Lot No. LRB No. Used Action   DEV CLS WND VLOC/90 ARIAS ABS 1/2CIR SZ3/0 26MM 23CM NELLY - [id number] Implant DEV CLS WND VLOC/90 ARIAS ABS 1/2CIR SZ3/0 26MM 23CM NELLY  COVIDIEN [id number] N/A 3 Implanted   DEV CLS WND VLOC/PBT ARIAS NONABS 1/2CIR SZ0 37MM 23CM LEXI - [id number] Implant DEV CLS WND VLOC/PBT AIRAS NONABS 1/2CIR SZ0 37MM 23CM LEXI  COVIDIEN [id number] N/A 1 Implanted   MESH VENTRALIGHT ST CIR 4.5IN - [id number] Implant MESH VENTRALIGHT ST CIR 4.5IN  DAVOL  (DIV OF CR PT [employer]) [id number] N/A 1 Implanted       Specimen:          None      Findings: Initial incarcerated umbilical hernia repair robotically with 4 cm fascial defect repaired with V-Loc suture followed by 11 cm Ventralight ST mesh placed in underlay fashion secured with V-Loc sutures    Complications: None    Description of Procedure:   After all questions were answered, consent was verified.  Patient brought to the operating room per stretcher placed in supine position arms out all extremities padded.  Bilateral lower extremity SCDs placed.  Anesthesia induced.  Preoperative IV antibiotics administered.  Bilateral upper extremities padded and tucked.  Patient's abdominal hair removed with  clippers.  Patient's abdomen prepped with ChloraPrep.  We waited 3 minutes.  We draped in usual sterile fashion.  Ioban applied.  Critical timeout taken.  Began the procedure with a stab incision at Gonzalez's point.  I placed a Veress needle.  Positive drop test.  I obtain pneumoperitoneum with CO2 insufflation.  I rotated the patient to the right.  I made a transverse incision left mid lateral abdomen placing robotic a trocar and Optiview fashion without injury the viscera below.  Veress needle removed.  I placed a robotic 12 trocar in the left upper quadrant under direct vision.  I infiltrated with local anesthesia bilateral upper quadrants in a tap block fashion.  I placed a robotic 8 trocar in the left lower quadrant under direct vision.  We then docked the robot and placed instruments.  I then reduced incarcerated omentum from the umbilical hernia.  The omentum was viable.  I then cleared the anterior abdominal wall of intra-abdominal fat.  The fascial defect measured 4 cm.  I scored the fascial edges of the defect with scissor electrocautery.  I then closed the defect with V-Loc suture.  I then placed an 11 cm Ventralight ST mesh in underlay fashion secured with multiple V-Loc sutures.  We then removed needles and obtained a correct count.  We removed instruments and undocked the robot.  I scrubbed back in.  I closed the 12 trocar fascia with Blaise Del Rio device Vicryl suture.  We desufflated the abdomen remove the trocars.  The incisions were closed with staples.  Dressings applied.  At the end of the procedure all counts were correct.  I was present for the procedure.    Assistant: Jeannie Parsons CSA was responsible for performing the following activities: Closing, Placing Dressing, and docking and undocking robot and their skilled assistance was necessary for the success of this case.    Yosef Mcfarland MD     Date: 7/7/2025  Time: 08:39 EDT

## 2025-07-07 NOTE — ANESTHESIA POSTPROCEDURE EVALUATION
Patient: Aden Benitez    Procedure Summary       Date: 07/07/25 Room / Location: [facility] OR [address] [place] [address] MAIN OR    Anesthesia Start: 0732 Anesthesia Stop: 0836    Procedure: UMBILICAL HERNIA REPAIR LAPAROSCOPIC WITH DAVINCI ROBOT WITH MESH REPAIR UMBILICAL HERNIA WITH MESH (Abdomen) Diagnosis:       Incarcerated umbilical hernia      (Incarcerated umbilical hernia [K42.0])    Surgeons: Yosef Mcfarland MD Provider: Monalisa Ramon MD    Anesthesia Type: general ASA Status: 3            Anesthesia Type: general    Vitals  Vitals Value Taken Time   /95 07/07/25 09:09   Temp 37 °C (98.6 °F) 07/07/25 09:05   Pulse 61 07/07/25 09:09   Resp 18 07/07/25 09:05   SpO2 93 % 07/07/25 09:09   Vitals shown include unfiled device data.        Post Anesthesia Care and Evaluation    Patient location during evaluation: bedside  Patient participation: complete - patient participated  Level of consciousness: awake  Pain management: adequate    Airway patency: patent  PONV Status: none  Cardiovascular status: acceptable and stable  Respiratory status: acceptable  Hydration status: acceptable

## 2025-07-07 NOTE — DISCHARGE INSTRUCTIONS
DISCHARGE INSTRUCTIONS  HERNIA REPAIR      For your surgery you had:  General anesthesia (you may have a sore throat for the first 24 hours)    You may experience dizziness, drowsiness, or light-headedness for several hours following surgery/procedure.  Do not stay alone today or tonight.  Limit your activity for 24 hours.  Resume your diet slowly.  Follow whatever special dietary instructions you may have been given by your doctor.  You should not drive, operate machinery, drink alcohol, or sign legally binding documents for 24 hours or while you are taking pain medication.  NOTIFY YOUR DOCTOR IF YOU EXPERIENCE ANY OF THE FOLLOWING:  Temperature greater than 101 degrees Fahrenheit  Shaking Chills  Redness or excessive drainage from incision  Nausea, vomiting and/or pain that is not controlled by prescribed medications  Increase in bleeding or bleeding that is excessive  Unable to urinate in 6 hours after surgery  If unable to reach your doctor, please go to the closest Emergency Room [] You may remove dressing:   [] in 24 hours   [] in 48 hours   [] Skin adhesive flakes off in 10-14 days.   [] You may shower ___, no submerging of incisions for 2 weeks.  [] Use abdominal binder every day for two weeks, only taking off for sleeping and showering.  Do not do any heavy lifting, pushing or pulling.  You may walk up and down stairs.  You may ride in a car but do not drive until instructed by your physician.  Avoid constipation.  Apply an ice pack for 24-48 hours  If unable to urinate in 6 to 8 hours after surgery or urinating frequently in small amounts, notify your doctor or go to the nearest Emergency Room.  Medications per physician instructions as indicated on Discharge Medication Information Sheet.    Last dose of pain medication was given at:   0843- Next oose at 2:43pm  .

## 2025-07-07 NOTE — ANESTHESIA PREPROCEDURE EVALUATION
Anesthesia Evaluation     Patient summary reviewed and Nursing notes reviewed   no history of anesthetic complications:   NPO Solid Status: > 8 hours  NPO Liquid Status: > 2 hours           Airway   Mallampati: II  TM distance: >3 FB  Neck ROM: full  No difficulty expected  Dental          Pulmonary - negative pulmonary ROS and normal exam    breath sounds clear to auscultation  Cardiovascular - negative cardio ROS and normal exam  Exercise tolerance: good (4-7 METS)    Rhythm: regular  Rate: normal        Neuro/Psych- negative ROS  GI/Hepatic/Renal/Endo    (+) morbid obesity    Musculoskeletal (-) negative ROS    Abdominal   (+) obese   Substance History - negative use     OB/GYN negative ob/gyn ROS         Other - negative ROS       ROS/Med Hx Other: PAT Nursing Notes unavailable.               Anesthesia Plan    ASA 3     general     (Patient understands anesthesia not responsible for dental damage.)  intravenous induction     Anesthetic plan, risks, benefits, and alternatives have been provided, discussed and informed consent has been obtained with: patient.    Use of blood products discussed with patient .    Plan discussed with CRNA.    CODE STATUS:

## 2025-07-10 ENCOUNTER — TELEPHONE (OUTPATIENT)
Dept: SURGERY | Facility: CLINIC | Age: 45
End: 2025-07-10
Payer: COMMERCIAL

## 2025-07-10 NOTE — TELEPHONE ENCOUNTER
PATIENT CALLED AND SAID HE HAD HERNIA REPAIR 07/07/25.  HE REMOVED THE BANDAGES AND HAS JANA.  HE DOESN'T REMEMBER BEING TOLD HE WOULD HAVE THEM.  DOES HE NEED TO COVER THEM, AND IF SO, FOR HOW LONG?    #[phone]

## 2025-07-14 NOTE — TELEPHONE ENCOUNTER
PATIENT CALLED TO F/U ON P/O CONCERN.  IS HE SUPPOSED TO BE COVERING HIS STAPLES WITH BANDAGES?  HE HAS BEEN PUTTING BANDAGES ON THEM.  HE DIDN'T KNOW WAS GOING TO HAVE STAPLES.    DENIA IS NOT IN THE OFFICE TODAY.    #[phone]

## 2025-07-23 ENCOUNTER — OFFICE VISIT (OUTPATIENT)
Dept: SURGERY | Facility: CLINIC | Age: 45
End: 2025-07-23
Payer: COMMERCIAL

## 2025-07-23 VITALS — BODY MASS INDEX: 40.65 KG/M2 | RESPIRATION RATE: 20 BRPM | WEIGHT: 268.2 LBS | HEIGHT: 68 IN

## 2025-07-23 DIAGNOSIS — K42.9 UMBILICAL HERNIA WITHOUT OBSTRUCTION AND WITHOUT GANGRENE: Primary | ICD-10-CM

## 2025-07-23 PROCEDURE — 99212 OFFICE O/P EST SF 10 MIN: CPT | Performed by: SURGERY

## 2025-07-23 RX ORDER — ACETAMINOPHEN 500 MG
TABLET ORAL
COMMUNITY
Start: 2025-07-10

## 2025-07-23 NOTE — LETTER
July 24, 2025     Felipa Snyder MD  [address]  [address]  [place] [address]    Patient: Aden Benitez   YOB: 1980   Date of Visit: 7/23/2025     Dear Felipa Snyder MD:       Thank you for referring Aden Benitez to me for evaluation. Below are the relevant portions of my assessment and plan of care.    If you have questions, please do not hesitate to call me. I look forward to following Aden along with you.         Sincerely,        Yosef Mcfarland MD        CC: No Recipients    Yosef Mcfarland MD  07/24/25 1231  Sign when Signing Visit  General Surgery/Colorectal Surgery   Post -op Follow up Note    Patient Name:  Aden Benitez  YOB: 1980  [id number]    Referring Provider: No ref. provider found    Patient Care Team:  Felipa Snyder MD as PCP - General (Internal Medicine)  Yosef Mcfarland MD as Consulting Physician (General Surgery)    Chief complaint follow-up    Subjective.     History of present illness:    Status post robotic repair of umbilical hernia with mesh 7/7/2025    He comes in for follow-up.  He is doing well.  No fever, redness.  He is pleased with the surgery.    History:  Past Medical History:   Diagnosis Date   • Elevated LFTs     9/24/24 AST 34 AND ALT 75 PT DENIES ANY ISSUES   • Meningitis     AROUND 2018 HE THINKS   • Umbilical hernia        Past Surgical History:   Procedure Laterality Date   • VENTRAL HERNIA REPAIR N/A 7/7/2025    Procedure: UMBILICAL HERNIA REPAIR LAPAROSCOPIC WITH DAVINCI ROBOT WITH MESH REPAIR UMBILICAL HERNIA WITH MESH;  Surgeon: Yosef Mcfarland MD;  Location: [facility] OR;  Service: Robotics - DaVinci;  Laterality: N/A;   • WISDOM TOOTH EXTRACTION         Family History   Problem Relation Age of Onset   • Heart disease Father         Heart valve issue       Social History     Tobacco Use   • Smoking status: Never   • Smokeless tobacco: Never   Vaping Use   • Vaping status: Never Used   Substance  Use Topics   • Alcohol use: Yes     Alcohol/week: 2.0 standard drinks of alcohol     Types: 1 Glasses of wine, 1 Cans of beer per week     Comment: Social drink less than one per week on average   • Drug use: Never       MEDS:  Prior to Admission medications    Medication Sig Start Date End Date Taking? Authorizing Provider   acetaminophen (TYLENOL) 500 MG tablet  7/10/25  Yes Safia Concepcion MD   cetirizine (zyrTEC) 10 MG tablet Take 1 tablet by mouth Every Night.   Yes ProviderSafia MD   multivitamin with minerals tablet tablet Take 1 tablet by mouth Daily.   Yes ProviderSafia MD   oxyCODONE-acetaminophen (Percocet) 5-325 MG per tablet Take 1 tablet by mouth Every 6 (Six) Hours As Needed for Moderate Pain. 7/7/25   Yosef Mcfarland MD   polyethylene glycol (MIRALAX) 17 g packet Take 17 g by mouth Daily. 7/7/25   Yosef Mcfarland MD             No current facility-administered medications for this visit.       Allergies:  Patient has no known allergies.    Objective    Vital Signs   Resp:  [20] 20    Physical Exam:     Incisions without evidence of infection abdomen soft, nontender, no hernia    Results Review: I have reviewed the patient's labs and imaging    LABS/IMAGING:    Imaging Results (Last 72 Hours)       ** No results found for the last 72 hours. **             Lab Results (last 72 hours)       ** No results found for the last 72 hours. **               Result Review:Labs  Result Review  Imaging  Med Tab  Media    Assessment & Plan    * No active hospital problems. *     Status post robotic repair of umbilical hernia with mesh 7/7/2025    I reviewed the details of the surgery with the patient.  Staples removed.  No lifting greater than 10 pounds for 6 weeks from the time of surgery.  Gentle stretching.  Follow-up with me as needed.  All questions answered.  He agrees with the plan.  Thank you for the consult.          Yosef Mcfarland MD  07/24/25 12:09 EDT

## 2025-07-24 NOTE — PROGRESS NOTES
General Surgery/Colorectal Surgery   Post -op Follow up Note    Patient Name:  Aden Benitez  YOB: 1980  [id number]    Referring Provider: No ref. provider found    Patient Care Team:  Felipa Snyder MD as PCP - General (Internal Medicine)  Yosef Mcfarland MD as Consulting Physician (General Surgery)    Chief complaint follow-up    Subjective .     History of present illness:    Status post robotic repair of umbilical hernia with mesh 7/7/2025    He comes in for follow-up.  He is doing well.  No fever, redness.  He is pleased with the surgery.    History:  Past Medical History:   Diagnosis Date    Elevated LFTs     9/24/24 AST 34 AND ALT 75 PT DENIES ANY ISSUES    Meningitis     AROUND 2018 HE THINKS    Umbilical hernia        Past Surgical History:   Procedure Laterality Date    VENTRAL HERNIA REPAIR N/A 7/7/2025    Procedure: UMBILICAL HERNIA REPAIR LAPAROSCOPIC WITH DAVINCI ROBOT WITH MESH REPAIR UMBILICAL HERNIA WITH MESH;  Surgeon: Yosef Mcfarland MD;  Location[facility] MAIN OR;  Service: Robotics - DaVinci;  Laterality: N/A;    WISDOM TOOTH EXTRACTION         Family History   Problem Relation Age of Onset    Heart disease Father         Heart valve issue       Social History     Tobacco Use    Smoking status: Never    Smokeless tobacco: Never   Vaping Use    Vaping status: Never Used   Substance Use Topics    Alcohol use: Yes     Alcohol/week: 2.0 standard drinks of alcohol     Types: 1 Glasses of wine, 1 Cans of beer per week     Comment: Social drink less than one per week on average    Drug use: Never       MEDS:  Prior to Admission medications    Medication Sig Start Date End Date Taking? Authorizing Provider   acetaminophen (TYLENOL) 500 MG tablet  7/10/25  Yes Safia Concepcion MD   cetirizine (zyrTEC) 10 MG tablet Take 1 tablet by mouth Every Night.   Yes Safia Concepcion MD   multivitamin with minerals tablet tablet Take 1 tablet by mouth Daily.   Yes Jamey  MD Safia   oxyCODONE-acetaminophen (Percocet) 5-325 MG per tablet Take 1 tablet by mouth Every 6 (Six) Hours As Needed for Moderate Pain. 7/7/25   Yosef Mcfarland MD   polyethylene glycol (MIRALAX) 17 g packet Take 17 g by mouth Daily. 7/7/25   Yosef Mcfarland MD             No current facility-administered medications for this visit.       Allergies:  Patient has no known allergies.    Objective     Vital Signs   Resp:  [20] 20    Physical Exam:     Incisions without evidence of infection abdomen soft, nontender, no hernia    Results Review: I have reviewed the patient's labs and imaging    LABS/IMAGING:    Imaging Results (Last 72 Hours)       ** No results found for the last 72 hours. **             Lab Results (last 72 hours)       ** No results found for the last 72 hours. **               Result Review :Labs  Result Review  Imaging  Med Tab  Media    Assessment & Plan     * No active hospital problems. *     Status post robotic repair of umbilical hernia with mesh 7/7/2025    I reviewed the details of the surgery with the patient.  Staples removed.  No lifting greater than 10 pounds for 6 weeks from the time of surgery.  Gentle stretching.  Follow-up with me as needed.  All questions answered.  He agrees with the plan.  Thank you for the consult.          Yosef Mcfarland MD  07/24/25 12:09 EDT

## (undated) DEVICE — SUT VIC PLS CTD BR 0 TIE 18IN VIL

## (undated) DEVICE — BLADELESS OBTURATOR: Brand: WECK VISTA

## (undated) DEVICE — ARM DRAPE

## (undated) DEVICE — 3M™ IOBAN™ 2 ANTIMICROBIAL INCISE DRAPE 6650EZ: Brand: IOBAN™ 2

## (undated) DEVICE — LAPAROVUE VISIBILITY SYSTEM LAPAROSCOPIC SOLUTIONS: Brand: LAPAROVUE

## (undated) DEVICE — INTENDED FOR TISSUE SEPARATION, AND OTHER PROCEDURES THAT REQUIRE A SHARP SURGICAL BLADE TO PUNCTURE OR CUT.: Brand: BARD-PARKER ® CARBON RIB-BACK BLADES

## (undated) DEVICE — STERILE POLYISOPRENE POWDER-FREE SURGICAL GLOVES WITH EMOLLIENT COATING: Brand: PROTEXIS

## (undated) DEVICE — DAVINCI-LF: Brand: MEDLINE INDUSTRIES, INC.

## (undated) DEVICE — THE STERILE LIGHT HANDLE COVER IS USED WITH STERIS SURGICAL LIGHTING AND VISUALIZATION SYSTEMS.

## (undated) DEVICE — SYR LUERLOK 30CC

## (undated) DEVICE — STERILE POLYISOPRENE POWDER-FREE SURGICAL GLOVES: Brand: PROTEXIS

## (undated) DEVICE — ABDOMINAL BINDER, ILIO BYPASS: Brand: DEROYAL

## (undated) DEVICE — SEAL

## (undated) DEVICE — SYR LL TP 10ML STRL

## (undated) DEVICE — REDUCER: Brand: ENDOWRIST

## (undated) DEVICE — GLV SURG BIOGEL LTX PF 7 1/2

## (undated) DEVICE — 40585 XL ADVANCED TRENDELENBURG POSITIONING KIT: Brand: 40585 XL ADVANCED TRENDELENBURG POSITIONING KIT

## (undated) DEVICE — LAPAROSCOPIC SMOKE EVACUATION SYSTEM ACTIVE AND PASSIVE: Brand: VALLEYLAB

## (undated) DEVICE — SOL IRR H2O BO 1000ML STRL

## (undated) DEVICE — PENCL ES MEGADINE EZ/CLEAN BUTN W/HOLSTR 10FT

## (undated) DEVICE — TIP COVER ACCESSORY

## (undated) DEVICE — SHEET,DRAPE,70X85,STERILE: Brand: MEDLINE

## (undated) DEVICE — SLV SCD KN/LEN ADJ EXPRSS BLENDED MD 1P/U

## (undated) DEVICE — SYS CLOSE PORTII CARTR/THOMASN XL